# Patient Record
Sex: MALE | Race: WHITE | NOT HISPANIC OR LATINO | URBAN - METROPOLITAN AREA
[De-identification: names, ages, dates, MRNs, and addresses within clinical notes are randomized per-mention and may not be internally consistent; named-entity substitution may affect disease eponyms.]

---

## 2017-04-19 ENCOUNTER — APPOINTMENT (RX ONLY)
Dept: URBAN - METROPOLITAN AREA SURGERY 2 | Facility: SURGERY | Age: 47
Setting detail: DERMATOLOGY
End: 2017-04-19

## 2017-04-19 DIAGNOSIS — Z41.9 ENCOUNTER FOR PROCEDURE FOR PURPOSES OTHER THAN REMEDYING HEALTH STATE, UNSPECIFIED: ICD-10-CM

## 2017-04-19 DIAGNOSIS — L90.5 SCAR CONDITIONS AND FIBROSIS OF SKIN: ICD-10-CM

## 2017-04-19 PROCEDURE — ? COUNSELING - SCAR

## 2017-04-19 PROCEDURE — ? PATIENT SPECIFIC COUNSELING

## 2017-04-19 PROCEDURE — 11900 INJECT SKIN LESIONS </W 7: CPT

## 2017-04-19 PROCEDURE — ? CONSULTATION - LHR

## 2017-04-19 PROCEDURE — ? INTRALESIONAL KENALOG

## 2017-04-19 ASSESSMENT — LOCATION DETAILED DESCRIPTION DERM
LOCATION DETAILED: LEFT FOREHEAD
LOCATION DETAILED: LEFT LATERAL SUPERIOR EYELID

## 2017-04-19 ASSESSMENT — LOCATION ZONE DERM
LOCATION ZONE: EYELID
LOCATION ZONE: FACE

## 2017-04-19 ASSESSMENT — LOCATION SIMPLE DESCRIPTION DERM
LOCATION SIMPLE: LEFT SUPERIOR EYELID
LOCATION SIMPLE: LEFT FOREHEAD

## 2017-04-19 NOTE — PROCEDURE: INTRALESIONAL KENALOG
Detail Level: Detailed
Concentration (Mg/Ml): 2
Kenalog Preparation: kenalog with lincocin in bacteriostatic water
Consent: The risks of atrophy were reviewed with the patient.
Administered By (Optional): Dr. Burton
Total Volume (Ccs): 0.3

## 2017-04-19 NOTE — PROCEDURE: PATIENT SPECIFIC COUNSELING
Detail Level: Zone
Other (Free Text): Informed patient that I noticed A little firm scar tissue over the left eye as well as a little tethering from internal scar tissue \\nI explained that there is not much I am able to do able to do with the scar on his forehead I explained that the scar has  depigmented due to damage to the pigment cells. I encouraged patient to wear sun screen and wear hats as well as purchasing biocorneum cream to help with fading the scar. I also advised patient to give the scar a full year to improve. \\n\\nThe scar on the eye lid was treated with a steroid injection kenalog 3. I advised patient to start massaging the area 1 week after injection by placing thumb against the orbital  area and massaging in a circular motion.

## 2017-06-14 ENCOUNTER — APPOINTMENT (RX ONLY)
Dept: URBAN - METROPOLITAN AREA SURGERY 2 | Facility: SURGERY | Age: 47
Setting detail: DERMATOLOGY
End: 2017-06-14

## 2017-06-14 DIAGNOSIS — L90.5 SCAR CONDITIONS AND FIBROSIS OF SKIN: ICD-10-CM | Status: IMPROVED

## 2017-06-14 PROCEDURE — 11900 INJECT SKIN LESIONS </W 7: CPT

## 2017-06-14 PROCEDURE — ? INTRALESIONAL KENALOG

## 2017-06-14 PROCEDURE — ? PATIENT SPECIFIC COUNSELING

## 2017-06-14 ASSESSMENT — LOCATION DETAILED DESCRIPTION DERM: LOCATION DETAILED: LEFT LATERAL EYEBROW

## 2017-06-14 ASSESSMENT — LOCATION ZONE DERM: LOCATION ZONE: FACE

## 2017-06-14 ASSESSMENT — LOCATION SIMPLE DESCRIPTION DERM: LOCATION SIMPLE: LEFT EYEBROW

## 2017-06-14 NOTE — PROCEDURE: PATIENT SPECIFIC COUNSELING
Other (Free Text): Explained to patient that the scar has improved since the last visit. \\nDiscussed with patient that scar is softened.\\nAdvised patient against getting anymore steroid injections in his scar.\\n\\nTHE REFERENCE TO 'LINCOCIN' IS A GRAMMATICAL ERROR.  KENOLOG-10 WAS MIXED 1:1 WITH SALINE AT ALL TIMES AND ALL VISITS FOR THIS PATIENT
Detail Level: Simple

## 2017-06-14 NOTE — PROCEDURE: INTRALESIONAL KENALOG
Consent: The risks of atrophy were reviewed with the patient.
Concentration (Mg/Ml): 10
Detail Level: Detailed
Administered By (Optional): Dr. Burton
Kenalog Preparation: kenalog with lincocin in bacteriostatic water
Total Volume (Ccs): 0.4